# Patient Record
Sex: FEMALE | NOT HISPANIC OR LATINO | Employment: UNEMPLOYED | RURAL
[De-identification: names, ages, dates, MRNs, and addresses within clinical notes are randomized per-mention and may not be internally consistent; named-entity substitution may affect disease eponyms.]

---

## 2017-01-25 ENCOUNTER — OFFICE VISIT (OUTPATIENT)
Dept: FAMILY MEDICINE CLINIC | Facility: CLINIC | Age: 72
End: 2017-01-25

## 2017-01-25 VITALS
DIASTOLIC BLOOD PRESSURE: 74 MMHG | HEIGHT: 60 IN | BODY MASS INDEX: 24.15 KG/M2 | HEART RATE: 87 BPM | OXYGEN SATURATION: 98 % | WEIGHT: 123 LBS | TEMPERATURE: 98 F | SYSTOLIC BLOOD PRESSURE: 124 MMHG

## 2017-01-25 DIAGNOSIS — K21.9 GASTROESOPHAGEAL REFLUX DISEASE, ESOPHAGITIS PRESENCE NOT SPECIFIED: ICD-10-CM

## 2017-01-25 DIAGNOSIS — R73.03 PREDIABETES: ICD-10-CM

## 2017-01-25 DIAGNOSIS — E78.2 MIXED HYPERLIPIDEMIA: ICD-10-CM

## 2017-01-25 DIAGNOSIS — G89.29 CHRONIC PAIN OF BOTH KNEES: ICD-10-CM

## 2017-01-25 DIAGNOSIS — R10.13 EPIGASTRIC PAIN: ICD-10-CM

## 2017-01-25 DIAGNOSIS — T78.3XXA ANGIOEDEMA, INITIAL ENCOUNTER: Primary | ICD-10-CM

## 2017-01-25 DIAGNOSIS — M25.561 CHRONIC PAIN OF BOTH KNEES: ICD-10-CM

## 2017-01-25 DIAGNOSIS — M81.0 OSTEOPOROSIS: ICD-10-CM

## 2017-01-25 DIAGNOSIS — M25.562 CHRONIC PAIN OF BOTH KNEES: ICD-10-CM

## 2017-01-25 DIAGNOSIS — I10 ESSENTIAL HYPERTENSION: ICD-10-CM

## 2017-01-25 DIAGNOSIS — M54.42 LOW BACK PAIN WITH BILATERAL SCIATICA, UNSPECIFIED BACK PAIN LATERALITY, UNSPECIFIED CHRONICITY: ICD-10-CM

## 2017-01-25 DIAGNOSIS — M54.41 LOW BACK PAIN WITH BILATERAL SCIATICA, UNSPECIFIED BACK PAIN LATERALITY, UNSPECIFIED CHRONICITY: ICD-10-CM

## 2017-01-25 PROCEDURE — 99214 OFFICE O/P EST MOD 30 MIN: CPT | Performed by: FAMILY MEDICINE

## 2017-01-25 RX ORDER — HYDROCHLOROTHIAZIDE 25 MG/1
25 TABLET ORAL DAILY
Qty: 30 TABLET | Refills: 11 | Status: SHIPPED | OUTPATIENT
Start: 2017-01-25 | End: 2017-05-05 | Stop reason: SDUPTHER

## 2017-01-25 RX ORDER — FEXOFENADINE HYDROCHLORIDE 60 MG/1
60 TABLET, FILM COATED ORAL DAILY
Qty: 30 TABLET | Refills: 11 | Status: SHIPPED | OUTPATIENT
Start: 2017-01-25 | End: 2017-02-24

## 2017-01-25 NOTE — MR AVS SNAPSHOT
Brionna Salomón   1/25/2017 10:45 AM   Office Visit    Dept Phone:  [phone]   Encounter #:  [id number]    Provider:  Modesto Trujillo MD   Department:  [facility]                Your Full Care Plan              Today's Medication Changes          These changes are accurate as of: 1/25/17 10:54 AM.  If you have any questions, ask your nurse or doctor.               New Medication(s)Ordered:     fexofenadine 60 MG tablet   Commonly known as:  ALLEGRA ALLERGY   Take 1 tablet by mouth Daily.   Started by:  [facility] Trujillo MD         Medication(s)that have changed:     hydrochlorothiazide 25 MG tablet   Commonly known as:  HYDRODIURIL   Take 1 tablet by mouth Daily.   What changed:    - medication strength  - how much to take   Changed by:  [facility] Trujillo MD         Stop taking medication(s)listed here:     losartan 25 MG tablet   Commonly known as:  COZAAR   Stopped by:  Modesto Trujillo MD                Where to Get Your Medications      These medications were sent to [facility] [address] [place] [address] [phone] [place] [phone] [place] [address]     Phone:  [phone]     fexofenadine 60 MG tablet    hydrochlorothiazide 25 MG tablet                  Your Updated Medication List          This list is accurate as of: 1/25/17 10:54 AM.  Always use your most recent med list.                alendronate 70 MG tablet   Commonly known as:  FOSAMAX   Take 1 tablet by mouth every 7 days.       aspirin 81 MG chewable tablet   Chew 1 tablet daily.       fexofenadine 60 MG tablet   Commonly known as:  ALLEGRA ALLERGY   Take 1 tablet by mouth Daily.       fluticasone 50 MCG/ACT nasal spray   Commonly known as:  FLONASE   USE TWO SPRAY(S) IN EACH NOSTRIL ONCE DAILY FOR 30 DAYS       hydrochlorothiazide 25 MG tablet   Commonly known as:  HYDRODIURIL   Take 1 tablet by mouth Daily.       omeprazole 40  MG capsule   Commonly known as:  priLOSEC   Take 1 capsule by mouth Daily.       RA COL-RITE 100 MG capsule   Generic drug:  docusate sodium       TYLENOL 500 MG tablet   Generic drug:  acetaminophen       Vitamin D3 03761 UNITS capsule   Take 1 capsule by mouth every 7 days.       ZOCOR 40 MG tablet   Generic drug:  simvastatin               We Performed the Following     Allergens, Zone 5     CBC Auto Differential     Comprehensive Metabolic Panel     Hemoglobin A1c     Lipid Panel     Vitamin D 25 Hydroxy     XR Knee 1 or 2 View Bilateral     XR Spine Lumbar 4+ View       You Were Diagnosed With        Codes Comments    Angioedema, initial encounter    -  Primary ICD-10-CM: T78.3XXA  ICD-9-CM: 995.1     Essential hypertension     ICD-10-CM: I10  ICD-9-CM: 401.9     Mixed hyperlipidemia     ICD-10-CM: E78.2  ICD-9-CM: 272.2     Gastroesophageal reflux disease, esophagitis presence not specified     ICD-10-CM: K21.9  ICD-9-CM: 530.81     Osteoporosis     ICD-10-CM: M81.0  ICD-9-CM: 733.00     Epigastric pain     ICD-10-CM: R10.13  ICD-9-CM: 789.06     Prediabetes     ICD-10-CM: R73.03  ICD-9-CM: 790.29     Chronic pain of both knees     ICD-10-CM: M25.561, M25.562, G89.29  ICD-9-CM: 719.46, 338.29     Low back pain with bilateral sciatica, unspecified back pain laterality, unspecified chronicity     ICD-10-CM: M54.42, M54.41  ICD-9-CM: 724.3       Instructions    Stop losartan   - take HCTZ 12.5 mg x 2 pills = 25 mg and then  new medication    - continue checking Blood pressure at home   - will send back to [facility] for possible EGD  - continue omeprazole      Patient Instructions History      Upcoming Appointments     Visit Type Date Time Department    OFFICE VISIT 1/25/2017 10:45 AM [facility]    OFFICE VISIT 2/24/2017  8:45 AM [facility]      MyChart Signup     Our records indicate that you have declined [facility] signup. If you would like to sign up for [facility],  "please email [email or web address] or call [phone] to obtain an activation code.             Other Info from Your Visit           Your Appointments     Feb 24, 2017  8:45 AM CST   Office Visit with Modesto Trujillo MD   [facility] (--)    [address] [place], [address] [address] [phone]           Arrive 15 minutes prior to appointment.              Allergies     Lisinopril  Cough      Reason for Visit     Hypertension     Heartburn           Vital Signs     Blood Pressure Pulse Temperature Height Weight Oxygen Saturation    124/74 (BP Location: Right arm, Patient Position: Sitting, Cuff Size: Adult) 87 98 °F (36.7 °C) (Oral) 60\" (152.4 cm) 123 lb (55.8 kg) 98%    Body Mass Index Smoking Status                24.02 kg/m2 Never Smoker          Problems and Diagnoses Noted     Abdominal pain, pit of stomach    High blood pressure    Acid reflux disease    Mixed hyperlipidemia    Osteoporosis    Prediabetes    Angioedema    -  Primary    Chronic pain of both knees        Low back pain with bilateral sciatica, unspecified back pain laterality, unspecified chronicity            "

## 2017-01-25 NOTE — PROGRESS NOTES
Subjective   Brionna Lorenzana is a 71 y.o. female.       Problem List  1. Essential Hypertension  2. Hyperlipidemia  3. Constipation  4. Vitamin D deficiency  5. Osteoporosis.  6. Hypertriglyceridemia  7. Prediabetes  8. Microalbuminuria  9. Microdirect laryngoscopy with excision of right vocal cord lesion.  10. External and internal hemorrhoids    Pt is 72 yo  female who is here for recheck. On last visit had issues with GERD and epigastric tenderness.  Was started on Omeprazole 40 mg PO q daily.  States her epigastric pain is slightly better but still has tenderness. Has cut down on sodas.  Pain in epigastric area about 3/10 on severity.  No alarming symptoms. Had colonoscopy last year with Dr. Sandra but has not had EGD yet.  Pain does not radiate to back    Pt and daughter state that about New Year's Dawn pt developed swelling of lips.  Daughter thinks it may have been due to donut she ate.  Also occurred again this past Saturday.  Pt denies any shortness of breath but daughter thinks she may have.  Also sees Dr. Silva (ENT) for prior excision of right vocal cord. Was on lisinopril before for hypertension but that was discontinued. Pt was switched to losartan. Went to [facility] ER on New Year and did not see physician.  Was given Benadryl and that seemed to help symptoms.  Daughter gave another Benadryl which also helped.   Pt and daughter do not recall any history of food allergies.      Also has been having issues with lower back pain and bilateral knee pain that has been present for years. States lately it has been getting worse due to cold weather.  Does take Tylenol for pain which seems to help       Past Medical History   Diagnosis Date   • Constipation    • Dyslipidemia    • Dysphagia      unspecified   • Encounter for screening for malignant neoplasm of colon    • Essential hypertension    • Hypercholesteremia    • Vitamin D deficiency      unspecified     Past Surgical History   Procedure Laterality Date    • Laryngoscopy  03/23/2016     Microdirect laryngoscopy with excision of right vocal cord lesion.   • Tubal abdominal ligation  1976   • Colonoscopy  08/03/2016       Hypertension   This is a chronic problem. The problem is unchanged. The problem is controlled. Pertinent negatives include no anxiety, blurred vision, chest pain, headaches, malaise/fatigue, neck pain, orthopnea, palpitations, peripheral edema, PND, shortness of breath or sweats. There are no associated agents to hypertension. Risk factors for coronary artery disease include obesity, sedentary lifestyle and post-menopausal state. Past treatments include angiotensin blockers and diuretics. The current treatment provides significant improvement. There are no compliance problems.  There is no history of angina, kidney disease, CAD/MI, CVA, heart failure, left ventricular hypertrophy, PVD, renovascular disease, retinopathy or a thyroid problem. There is no history of chronic renal disease, coarctation of the aorta, hyperaldosteronism, hypercortisolism, hyperparathyroidism, a hypertension causing med, pheochromocytoma or sleep apnea.   Heartburn   She complains of abdominal pain and belching. She reports no chest pain, no choking, no coughing, no dysphagia, no early satiety, no globus sensation, no heartburn, no hoarse voice, no nausea, no sore throat, no stridor, no tooth decay, no water brash or no wheezing. This is a recurrent problem. The current episode started more than 1 month ago. The problem occurs occasionally. Pertinent negatives include no anemia, fatigue, melena, muscle weakness, orthopnea or weight loss. There are no known risk factors. She has tried a PPI for the symptoms. Past procedures do not include an abdominal ultrasound, an EGD, esophageal manometry, esophageal pH monitoring, H. pylori antibody titer or a UGI. Past invasive treatments do not include gastroplasty, gastroplication or reflux surgery.          The following portions of  "the patient's history were reviewed and updated as appropriate: allergies, current medications, past family history, past medical history, past social history, past surgical history and problem list.    Review of Systems   Constitutional: Negative.  Negative for fatigue, malaise/fatigue and weight loss.   HENT: Negative.  Negative for hoarse voice and sore throat.         Lip swelling    Eyes: Negative.  Negative for blurred vision.   Respiratory: Negative.  Negative for cough, choking, shortness of breath and wheezing.    Cardiovascular: Negative for chest pain, palpitations, orthopnea and PND.   Gastrointestinal: Positive for abdominal pain. Negative for dysphagia, heartburn, melena and nausea.   Endocrine: Negative.    Genitourinary: Negative.    Musculoskeletal: Negative.  Negative for muscle weakness and neck pain.   Skin: Negative.    Allergic/Immunologic: Negative.    Neurological: Negative.  Negative for headaches.   Hematological: Negative.    Psychiatric/Behavioral: Negative.        Objective    Visit Vitals   • /74 (BP Location: Right arm, Patient Position: Sitting, Cuff Size: Adult)   • Pulse 87   • Temp 98 °F (36.7 °C) (Oral)   • Ht 60\" (152.4 cm)   • Wt 123 lb (55.8 kg)   • SpO2 98%   • BMI 24.02 kg/m2       Chemistry        Component Value Date/Time     08/24/2016 0845    K 4.1 08/24/2016 0845    CL 99 08/24/2016 0845    CO2 31 08/24/2016 0845    BUN 18 08/24/2016 0845    CREATININE 0.9 08/24/2016 0845        Component Value Date/Time    CALCIUM 10.4 (H) 08/24/2016 0845    ALKPHOS 78 08/24/2016 0845    AST 30 08/24/2016 0845    ALT 33 08/24/2016 0845    BILITOT 0.5 08/24/2016 0845        Lab Results   Component Value Date    WBC 9.2 08/24/2016    HGB 13.5 08/24/2016    HCT 40.2 08/24/2016    MCV 90.1 08/24/2016     08/24/2016     Lab Results   Component Value Date    HGBA1C 5.9 (H) 08/24/2016     No results found for: CHOL  Lab Results   Component Value Date    TRIG 171 08/24/2016 "    TRIG 238 (H) 05/24/2016    TRIG 271 (H) 02/11/2016     Lab Results   Component Value Date    HDL 51 (L) 08/24/2016    HDL 54 (L) 05/24/2016    HDL 52 (L) 02/11/2016     Lab Results   Component Value Date    LDLCALC 110 08/24/2016    LDLCALC 104 05/24/2016    LDLCALC 203 (H) 02/11/2016     No results found for: LDL  No results found for: HDLLDLRATIO  No components found for: CHOLHDL     Lab Results   Component Value Date    TSH 1.41 02/11/2016     Physical Exam   Constitutional: She is oriented to person, place, and time. She appears well-developed and well-nourished. No distress.   HENT:   Head: Normocephalic and atraumatic.   Right Ear: External ear normal.   Left Ear: External ear normal.   Eyes: Conjunctivae and EOM are normal. Pupils are equal, round, and reactive to light. Right eye exhibits no discharge. Left eye exhibits no discharge. No scleral icterus.   Neck: Normal range of motion. Neck supple. No JVD present. No tracheal deviation present. No thyromegaly present.   Cardiovascular: Normal rate, regular rhythm and normal heart sounds.    Pulmonary/Chest: Effort normal and breath sounds normal. No stridor. No respiratory distress. She has no wheezes.   Abdominal: Soft. Bowel sounds are normal. She exhibits no distension and no mass. There is tenderness. There is no rebound and no guarding. No hernia.    Mild Epigastric tenderness on palpation'  No rebound tenderness no rigidity    Musculoskeletal: She exhibits tenderness. She exhibits no edema or deformity.        Right knee: She exhibits decreased range of motion and swelling. Tenderness found.        Left knee: She exhibits decreased range of motion and swelling. Tenderness found.        Lumbar back: She exhibits decreased range of motion, tenderness, bony tenderness, pain and spasm. She exhibits no swelling, no edema, no deformity, no laceration and normal pulse.   Lymphadenopathy:     She has no cervical adenopathy.   Neurological: She is alert and  oriented to person, place, and time. She has normal reflexes. She displays normal reflexes. No cranial nerve deficit. Coordination normal.   Skin: Skin is warm and dry. No rash noted. She is not diaphoretic. No erythema. No pallor.   Psychiatric: She has a normal mood and affect. Her behavior is normal. Judgment and thought content normal.   Nursing note and vitals reviewed.      Assessment/Plan   Problems Addressed this Visit        Cardiovascular and Mediastinum    Mixed hyperlipidemia    Relevant Orders    CBC Auto Differential    Comprehensive Metabolic Panel    Hemoglobin A1c    Lipid Panel    Vitamin D 25 Hydroxy    Essential hypertension    Relevant Medications    hydrochlorothiazide (HYDRODIURIL) 25 MG tablet       Digestive    Gastroesophageal reflux disease       Nervous and Auditory    Epigastric pain       Musculoskeletal and Integument    Osteoporosis       Other    Prediabetes    Angioedema - Primary    Relevant Orders    Allergens, Zone 5    Low back pain with bilateral sciatica    Relevant Orders    XR Spine Lumbar 4+ View    Chronic pain of both knees    Relevant Orders    XR Knee 1 or 2 View Bilateral        - For epigastric tenderness, GERD will refer back to [facility] / Dr. Sandra for possible EGD. Consultation appreciated. Continue with Omeprazole 40 mg PO q daily. Advised to limit caffeine and spicy foods  - will get basic labwork  Recheck cbc, cmp, hga1c for prediabetes and lipid panel for hyperlipidemia.    - continue with aspirin and simvastatin for hyperlipidemia .    - for hypertension - at goal today. Will stop losartan since potential side effect is angioedema.  Will increase HCTZ from 12.5 to 25 mg PO q daily. Pt to check BP at home and bring on next visit  - for osteoporosis - continue with fosamax once a week  -  Angioedema - will stop losartan. Will get allergy food panel.  Will start on allegra PO for allergies.  Consider CRP, ESR or urinalysis on next visit.  Pt to call me  or call 911 if symptoms persists or get worse   - for bilateral knee pain - will get x-rays of both knees - continue with Tylenol PRN  - for lower back pain - will get x-ray of lumbar spine - continue with Tylenol PRN  - recheck in 1 month

## 2017-01-25 NOTE — PATIENT INSTRUCTIONS
Stop losartan   - take HCTZ 12.5 mg x 2 pills = 25 mg and then  new medication    - continue checking Blood pressure at home   - will send back to [facility] for possible EGD  - continue omeprazole

## 2017-01-27 LAB
25(OH)D3 SERPL-MCNC: 52.8 NG/ML (ref 30–100)
ALBUMIN SERPL-MCNC: 4.6 G/DL (ref 3.4–4.8)
ALBUMIN/GLOB SERPL: 1.4 G/DL (ref 1.1–1.8)
ALP SERPL-CCNC: 64 U/L (ref 38–126)
ALT SERPL W P-5'-P-CCNC: 33 U/L (ref 9–52)
ANION GAP SERPL CALCULATED.3IONS-SCNC: 15 MMOL/L (ref 5–15)
ARTICHOKE IGE QN: 135 MG/DL (ref 1–129)
AST SERPL-CCNC: 27 U/L (ref 14–36)
BILIRUB SERPL-MCNC: 0.6 MG/DL (ref 0.2–1.3)
BUN BLD-MCNC: 25 MG/DL (ref 7–21)
BUN/CREAT SERPL: 28.7 (ref 7–25)
CALCIUM SPEC-SCNC: 10.1 MG/DL (ref 8.4–10.2)
CHLORIDE SERPL-SCNC: 99 MMOL/L (ref 95–110)
CHOLEST SERPL-MCNC: 210 MG/DL (ref 0–199)
CO2 SERPL-SCNC: 30 MMOL/L (ref 22–31)
CREAT BLD-MCNC: 0.87 MG/DL (ref 0.5–1)
GFR SERPL CREATININE-BSD FRML MDRD: 64 ML/MIN/1.73 (ref 39–90)
GFR SERPL CREATININE-BSD FRML MDRD: 78 ML/MIN/1.73 (ref 39–90)
GLOBULIN UR ELPH-MCNC: 3.2 GM/DL (ref 2.3–3.5)
GLUCOSE BLD-MCNC: 106 MG/DL (ref 60–100)
HBA1C MFR BLD: 6.24 % (ref 4–5.6)
HDLC SERPL-MCNC: 56 MG/DL (ref 60–200)
LDLC/HDLC SERPL: 2.22 {RATIO} (ref 0–3.22)
POTASSIUM BLD-SCNC: 3.6 MMOL/L (ref 3.5–5.1)
PROT SERPL-MCNC: 7.8 G/DL (ref 6.3–8.6)
SODIUM BLD-SCNC: 144 MMOL/L (ref 137–145)
TRIGL SERPL-MCNC: 148 MG/DL (ref 20–199)

## 2017-01-27 PROCEDURE — 86003 ALLG SPEC IGE CRUDE XTRC EA: CPT | Performed by: FAMILY MEDICINE

## 2017-01-27 PROCEDURE — 85025 COMPLETE CBC W/AUTO DIFF WBC: CPT | Performed by: FAMILY MEDICINE

## 2017-01-27 PROCEDURE — 80061 LIPID PANEL: CPT | Performed by: FAMILY MEDICINE

## 2017-01-27 PROCEDURE — 83036 HEMOGLOBIN GLYCOSYLATED A1C: CPT | Performed by: FAMILY MEDICINE

## 2017-01-27 PROCEDURE — 82306 VITAMIN D 25 HYDROXY: CPT | Performed by: FAMILY MEDICINE

## 2017-01-27 PROCEDURE — 80053 COMPREHEN METABOLIC PANEL: CPT | Performed by: FAMILY MEDICINE

## 2017-01-31 LAB
A ALTERNATA IGE QN: <0.1 KU/L
A FUMIGATUS IGE QN: <0.1 KU/L
AMER ROACH IGE QN: 0.27 KU/L
BAHIA GRASS IGE QN: <0.1 KU/L
BAYBERRY POLN IGE QN: <0.1 KU/L
BERMUDA GRASS IGE QN: <0.1 KU/L
BOXELDER IGE QN: <0.1 KU/L
C HERBARUM IGE QN: <0.1 KU/L
CAT DANDER IGG QN: <0.1 KU/L
COMMON RAGWEED IGE QN: <0.1 KU/L
CONV CLASS DESCRIPTION: ABNORMAL
D FARINAE IGE QN: 1.99 KU/L
D PTERONYSS IGE QN: 0.55 KU/L
DOG DANDER IGE QN: 0.23 KU/L
DOG FENNEL IGE QN: <0.1 KU/L
ENGL PLANTAIN IGE QN: <0.1 KU/L
GOOSEFOOT IGE QN: <0.1 KU/L
GUM-TREE IGE QN: <0.1 KU/L
ITALIAN CYPRESS IGE QN: <0.1 KU/L
JOHNSON GRASS IGE QN: <0.1 KU/L
M RACEMOSUS IGE QN: <0.1 KU/L
P NOTATUM IGE QN: <0.1 KU/L
PEPPER TREE IGE QN: <0.1 KU/L
PER RYE GRASS IGE QN: <0.1 KU/L
QUEEN PALM IGE QN: <0.1 KU/L
S BOTRYOSUM IGE QN: <0.1 KU/L
SHEEP SORREL IGE QN: <0.1 KU/L
T210-IGE PRIVET, COMMON: <0.1 KU/L
VIRG LIVE OAK IGE QN: <0.1 KU/L
WHITE ELM IGE QN: <0.1 KU/L

## 2017-02-01 ENCOUNTER — TELEPHONE (OUTPATIENT)
Dept: FAMILY MEDICINE CLINIC | Facility: CLINIC | Age: 72
End: 2017-02-01

## 2017-02-10 RX ORDER — SIMVASTATIN 40 MG
TABLET ORAL
Qty: 30 TABLET | Refills: 11 | Status: SHIPPED | OUTPATIENT
Start: 2017-02-10 | End: 2017-02-24

## 2017-02-24 ENCOUNTER — OFFICE VISIT (OUTPATIENT)
Dept: FAMILY MEDICINE CLINIC | Facility: CLINIC | Age: 72
End: 2017-02-24

## 2017-02-24 VITALS
DIASTOLIC BLOOD PRESSURE: 82 MMHG | BODY MASS INDEX: 23.84 KG/M2 | HEIGHT: 60 IN | WEIGHT: 121.4 LBS | TEMPERATURE: 98.2 F | HEART RATE: 80 BPM | OXYGEN SATURATION: 98 % | SYSTOLIC BLOOD PRESSURE: 138 MMHG

## 2017-02-24 DIAGNOSIS — R73.03 PREDIABETES: ICD-10-CM

## 2017-02-24 DIAGNOSIS — M51.36 DEGENERATIVE DISC DISEASE, LUMBAR: Primary | ICD-10-CM

## 2017-02-24 DIAGNOSIS — R12 HEARTBURN: ICD-10-CM

## 2017-02-24 DIAGNOSIS — E78.5 HYPERLIPIDEMIA, UNSPECIFIED HYPERLIPIDEMIA TYPE: ICD-10-CM

## 2017-02-24 DIAGNOSIS — K21.9 GASTROESOPHAGEAL REFLUX DISEASE, ESOPHAGITIS PRESENCE NOT SPECIFIED: ICD-10-CM

## 2017-02-24 PROCEDURE — 99213 OFFICE O/P EST LOW 20 MIN: CPT | Performed by: FAMILY MEDICINE

## 2017-02-24 RX ORDER — FEXOFENADINE HYDROCHLORIDE 60 MG/1
60 TABLET, FILM COATED ORAL 2 TIMES DAILY
COMMUNITY
End: 2017-05-05 | Stop reason: SDUPTHER

## 2017-02-24 RX ORDER — LOSARTAN POTASSIUM 25 MG/1
25 TABLET ORAL 2 TIMES DAILY
COMMUNITY
Start: 2017-02-10 | End: 2017-02-24 | Stop reason: SINTOL

## 2017-02-24 RX ORDER — SIMVASTATIN 80 MG
80 TABLET ORAL NIGHTLY
Qty: 30 TABLET | Refills: 11 | Status: SHIPPED | OUTPATIENT
Start: 2017-02-24 | End: 2017-05-05 | Stop reason: SDUPTHER

## 2017-02-24 NOTE — PROGRESS NOTES
Subjective   Brionna Lorenzana is a 71 y.o. female.     Problem List  1. Essential Hypertension  2. Hyperlipidemia  3. Constipation  4. Vitamin D deficiency  5. Osteoporosis.  6. Hypertriglyceridemia  7. Prediabetes  8. Microalbuminuria  9. Microdirect laryngoscopy with excision of right vocal cord lesion.  10. External and internal hemorrhoids    Pt is 72 yo  female with the above medical issues. States her epigastric pain, heartburn and reflux is better after starting omeprazole 40 mg PO q daily but pain in area still occurs every once in a while.  States she belches a lot too.  No dysphagia.  Pt had colonoscopy but has yet to have EGD. Is agreeable to have EGD done.  Last colonoscopy in August 2016 showed external and internal hemorrhoids.      Regarding lower back pain had x-ray of lumbar spine that showed degenerative disc disease.  She would like to do physical therapy. Pain in lower back mainly around left lower paraspinal muscles. Grades pain today 4/10 on severity.  Does not want to try medication for now    Regarding last labwork. hga1c got worse from 5.9 to 6.1  Has tried cutting down carbs and rice in diet  Regarding lipid panel. Cholesterol levels elevated.  Currently on simvastatin 40 mg PO q daily    Heartburn   She complains of abdominal pain and belching. She reports no choking, no coughing, no dysphagia, no early satiety, no globus sensation, no heartburn, no hoarse voice, no nausea, no sore throat, no stridor, no tooth decay, no water brash or no wheezing. This is a chronic problem. The current episode started more than 1 year ago. The problem occurs constantly. The problem has been gradually improving. Nothing aggravates the symptoms. Pertinent negatives include no anemia or fatigue. She has tried a PPI for the symptoms. The treatment provided moderate relief. Past procedures do not include an abdominal ultrasound, an EGD, esophageal manometry, esophageal pH monitoring, H. pylori antibody titer  "or a UGI. Past invasive treatments do not include gastroplasty, gastroplication or reflux surgery.     The following portions of the patient's history were reviewed and updated as appropriate: allergies, current medications, past family history, past medical history, past social history, past surgical history and problem list.    Review of Systems   Constitutional: Negative for fatigue.   HENT: Negative for hoarse voice and sore throat.    Respiratory: Negative for cough, choking and wheezing.    Gastrointestinal: Positive for abdominal pain. Negative for dysphagia, heartburn and nausea.       Objective    Visit Vitals   • /82 (BP Location: Left arm, Patient Position: Sitting, Cuff Size: Adult)   • Pulse 80   • Temp 98.2 °F (36.8 °C) (Oral)   • Ht 60\" (152.4 cm)   • Wt 121 lb 6.4 oz (55.1 kg)   • SpO2 98%   • BMI 23.71 kg/m2       Chemistry        Component Value Date/Time     01/27/2017 0857     08/24/2016 0845    K 3.6 01/27/2017 0857    K 4.1 08/24/2016 0845    CL 99 01/27/2017 0857    CL 99 08/24/2016 0845    CO2 30.0 01/27/2017 0857    CO2 31 08/24/2016 0845    BUN 25 (H) 01/27/2017 0857    BUN 18 08/24/2016 0845    CREATININE 0.87 01/27/2017 0857    CREATININE 0.9 08/24/2016 0845        Component Value Date/Time    CALCIUM 10.1 01/27/2017 0857    CALCIUM 10.4 (H) 08/24/2016 0845    ALKPHOS 64 01/27/2017 0857    ALKPHOS 78 08/24/2016 0845    AST 27 01/27/2017 0857    AST 30 08/24/2016 0845    ALT 33 01/27/2017 0857    ALT 33 08/24/2016 0845    BILITOT 0.6 01/27/2017 0857    BILITOT 0.5 08/24/2016 0845        Lab Results   Component Value Date    WBC 6.99 01/27/2017    HGB 13.3 01/27/2017    HCT 40.0 01/27/2017    MCV 88.9 01/27/2017     01/27/2017     Lab Results   Component Value Date    CHOL 210 (H) 01/27/2017     Lab Results   Component Value Date    TRIG 148 01/27/2017    TRIG 171 08/24/2016    TRIG 238 (H) 05/24/2016     Lab Results   Component Value Date    HDL 56 (L) 01/27/2017 "    HDL 51 (L) 08/24/2016    HDL 54 (L) 05/24/2016     Lab Results   Component Value Date    LDLCALC 110 08/24/2016    LDLCALC 104 05/24/2016    LDLCALC 203 (H) 02/11/2016     No results found for: LDL  No results found for: HDLLDLRATIO  No components found for: CHOLHDL  Lab Results   Component Value Date    HGBA1C 6.24 (H) 01/27/2017     Lab Results   Component Value Date    TSH 1.41 02/11/2016     Physical Exam   Constitutional: She is oriented to person, place, and time. She appears well-developed and well-nourished. No distress.   HENT:   Head: Atraumatic.   Right Ear: External ear normal.   Left Ear: External ear normal.   Eyes: Conjunctivae and EOM are normal. Pupils are equal, round, and reactive to light. Right eye exhibits no discharge. Left eye exhibits no discharge. No scleral icterus.   Neck: Normal range of motion. Neck supple. No JVD present. No tracheal deviation present. No thyromegaly present.   Cardiovascular: Normal rate, regular rhythm and normal heart sounds.  Exam reveals no friction rub.    No murmur heard.  Pulmonary/Chest: Effort normal and breath sounds normal. No stridor. No respiratory distress. She has no wheezes.   Abdominal: Soft. Bowel sounds are normal. She exhibits no distension and no mass. There is no tenderness. There is no rebound and no guarding. No hernia.   Musculoskeletal: She exhibits tenderness. She exhibits no edema or deformity.        Lumbar back: She exhibits decreased range of motion, tenderness, bony tenderness, pain and spasm.   Lymphadenopathy:     She has no cervical adenopathy.   Neurological: She is alert and oriented to person, place, and time. She has normal reflexes. No cranial nerve deficit. Coordination normal.   Skin: Skin is warm and dry. No rash noted. She is not diaphoretic. No erythema. No pallor.   Psychiatric: She has a normal mood and affect. Her behavior is normal. Thought content normal.       Assessment/Plan   Problems Addressed this Visit         Cardiovascular and Mediastinum    Hyperlipidemia    Relevant Medications    simvastatin (ZOCOR) 80 MG tablet       Digestive    Gastroesophageal reflux disease    Relevant Orders    Ambulatory Referral to Gastroenterology    Heartburn       Musculoskeletal and Integument    Degenerative disc disease, lumbar - Primary    Relevant Orders    Ambulatory Referral to Physical Therapy        - For GERD/heartburn will refer to [facility] and Dr. Sandra for possible EGD. Consultation appreciated  Pt to continue omeprazole 40 mg PO q daily  - for Degenerative Disc Disease lumbar spine - will refer to PT/OT before starting medication  - for hyperlipidemia- will go up on simvastatin from 40 to 80 mg PO q daily  - recheck in 3 months or earlier if any problems arise  - prediabetes - advised pt to limit carb and sugar intake. Continue to monitor hga1c

## 2017-03-10 ENCOUNTER — HOSPITAL ENCOUNTER (OUTPATIENT)
Dept: PHYSICAL THERAPY | Facility: HOSPITAL | Age: 72
Setting detail: THERAPIES SERIES
Discharge: HOME | End: 2017-03-10

## 2017-03-10 DIAGNOSIS — M51.36 DEGENERATIVE DISC DISEASE, LUMBAR: Primary | ICD-10-CM

## 2017-03-10 PROCEDURE — 97162 PT EVAL MOD COMPLEX 30 MIN: CPT | Performed by: PHYSICAL THERAPIST

## 2017-03-10 NOTE — PROGRESS NOTES
Outpatient Physical Therapy Ortho Initial Evaluation  [facility]  Alina Stanford, PT, DPT, CSCS       Patient Name: Brionna Lorenzana  : 1945  MRN: [id number]  Today's Date: 3/10/2017      Visit Date: 03/10/2017     Pt reports 5/10 pain.  Reports N/A% of improvement.  Attended  visits.  Insurance available: 20 visits per calendar year.  Next MD appt: 2017.  Recertification: 2017.    Patient Active Problem List   Diagnosis   • General medical examination   • Encounter for screening for osteoporosis   • Mixed hyperlipidemia   • Encounter for immunization   • Hypernatremia   • Microalbuminuria   • ACE-inhibitor cough   • Constipation   • Essential hypertension   • Prediabetes   • Osteoporosis   • Epigastric pain   • Gastroesophageal reflux disease   • Angioedema   • Low back pain with bilateral sciatica   • Chronic pain of both knees   • Heartburn   • Degenerative disc disease, lumbar   • Hyperlipidemia        Past Medical History   Diagnosis Date   • Constipation    • Dyslipidemia    • Dysphagia      unspecified   • Encounter for screening for malignant neoplasm of colon    • Essential hypertension    • Hypercholesteremia    • Vitamin D deficiency      unspecified        Past Surgical History   Procedure Laterality Date   • Laryngoscopy  2016     Microdirect laryngoscopy with excision of right vocal cord lesion.   • Tubal abdominal ligation     • Colonoscopy  2016       Visit Dx:     ICD-10-CM ICD-9-CM   1. Degenerative disc disease, lumbar M51.36 722.52     Number of days off work: N/A    Patient is .    Patient has grown children.    Medications: See EMR    Allergies: See EMR          Patient History       03/10/17 0900          History    Chief Complaint Pain  -AJ      Type of Pain Back pain  -AJ      Date Current Problem(s) Began --   Chronic, worsening over the last 3 months  -AJ      Brief Description of Current Complaint Patient reports when  the weather gets cold it really bothers her. No real previous issues Reports pain down into mainly the hips, but denies true numbness and tingling,  mainly aching in the hips.  -AJ      Previous treatment for THIS PROBLEM --   None.  -AJ      Patient/Caregiver Goals Relieve pain  -AJ      Current Tobacco Use None.  -AJ      Smoking Status Non-smoker  -AJ      Patient's Rating of General Health Good  -AJ      Occupation/sports/leisure activities Unemployed  -AJ      Patient seeing anyone else for problem(s)? No  -AJ      What clinical tests have you had for this problem? X-ray  -AJ      Results of Clinical Tests DDD of lumbar spine  -AJ      Related/Recent Hospitalizations No  -AJ      History of Previous Related Injuries None.  -AJ      Pain     Pain Location Back  -AJ      Pain at Present 5  -AJ      Pain at Best 0  -AJ      Pain at Worst 9  -AJ      Pain Frequency Intermittent  -AJ      Pain Description Dull;Aching;Tightness  -AJ      What Performance Factors Make the Current Problem(s) WORSE? Sleeping, sweeping, walking  -AJ      What Performance Factors Make the Current Problem(s) BETTER? Laying down, stretching  -AJ      Difficulties at work? N/A  -AJ      Difficulties with ADL's? Dressing  -AJ      Difficulties with recreational activities? sweeping, doing dishes  -AJ      Daily Activities    Primary Language --   Taglogtrust  -AJ      Action taken if English not primary language Used the   -AJ      Are you able to read Yes   Tagalog  -AJ      Are you able to write Yes   Tagalog  -AJ      How does patient learn best? Demonstration  -AJ      Teaching needs identified Management of Condition;Home Exercise Program  -AJ      Patient is concerned about/has problems with Standing  -AJ      Barriers to learning Language  -AJ      Action taken for identified issues Use of the interprature.  -AJ      Pt Participated in POC and Goals Yes   With use of interprature.  -AJ        User Key  (r) = Recorded By, (t) =  Taken By, (c) = Cosigned By    Initials Name Provider Type    AJ Alina Stanford, PT Physical Therapist                PT Ortho       03/10/17 0900    Subjective Comments    Subjective Comments Pain mainly at night and doing dishes.  -AJ    Precautions and Contraindications    Precautions Does not speak english  -AJ    Subjective Pain    Able to rate subjective pain? yes  -AJ    Pre-Treatment Pain Level 5  -AJ    Posture/Observations    Alignment Options Forward head;Cervical lordosis;Thoracic kyphosis;Rounded shoulders;Lumbar lordosis;Scoliosis  -AJ    Forward Head Mild;Increased  -AJ    Cervical Lordosis Mild;Increased  -AJ    Thoracic Kyphosis Mild;Increased  -AJ    Rounded Shoulders Bilateral:;Mild;Increased  -AJ    Scoliosis Normal   none noted  -AJ    Lumbar lordosis Normal  -AJ    Observations --   No acute distress  -AJ    Posture/Observations Comments Poor sitting postural awareness  -AJ    Sensation    Sensation WNL? WNL  -AJ    Light Touch No apparent deficits  -AJ    Sharp/Dull No apparent deficits  -AJ    Additional Comments Denies and specific numbness and tingling, does report pain in hips and down the legs, but denies it at numbness/tingling, more aching  -AJ    Lumbar/SI Special Tests    Standing Flexion Test (SI Dysfunction) Negative  -AJ    Stork Test (SI Dysfunction) Bilateral:;Negative  -AJ    Trendelenburg Test (Gluteus Medius Weakness) Bilateral:;Negative  -AJ    Slump Test (Neural Tension) Bilateral:;Negative  -AJ    Payton Álvaro Test (HNP) Negative  -AJ    SLR (Neural Tension) Bilateral:;Negative  -AJ    SI Compression Test (SI Dysfunction) Bilateral:;Negative  -AJ    SI Distraction Test (SI Dysfunction) Bilateral:;Negative  -AJ    MARQUIS (hip vs. SI Dysfunction) Bilateral:;Negative  -AJ    FAIR Test (Piriformis Syndrome) Bilateral:;Negative  -AJ    Sacral Spring Test (SI Dysfunction) Negative  -AJ    Trunk    Flexion AROM --   108°  -AJ    Extension AROM --   28°  -AJ    Left Lateral  Flexion AROM WNL (0-35 degrees)  -AJ    Right Lateral Flexion AROM WNL (0-35 degrees)  -AJ    Left Rotation AROM WNL (0-45 degrees)  -AJ    Right Rotation AROM WNL (0-45 degrees)  -AJ    MMT (Manual Muscle Testing)    General MMT Assessment Detail B LE 5/5 except the ones listed specifically.  -AJ    Left Hip    Hip Flexion Gross Movement (4+/5) good plus  -AJ    Right Hip    Hip Flexion Gross Movement (4+/5) good plus  -AJ    Left Knee    Knee Flexion Hamstrings (4+/5) good plus  -AJ    Right Knee    Knee Flexion Hamstrings (4+/5) good plus  -AJ    Flexibility    Flexibility Tested? Lower Extremity   all WNL  -AJ    Transfers    Transfer, Comment I with all transfer.  -    Gait Assessment/Treatment    Gait, Comment FWB, non-antalgic gait, no acute distress  -      User Key  (r) = Recorded By, (t) = Taken By, (c) = Cosigned By    Initials Name Provider Type    TATIANA Stanford PT Physical Therapist                  Therapy Education       03/10/17 0900          Therapy Education    Given --   Discussion of POC  -      Program New  -TATIANA      How Provided Verbal  -AJ      Provided to Patient;Caregiver  -TATIANA      Level of Understanding Verbalized  -        User Key  (r) = Recorded By, (t) = Taken By, (c) = Cosigned By    Initials Name Provider Type    TATIANA Stanford PT Physical Therapist                PT OP Goals       03/10/17 0900       PT Short Term Goals    STG Date to Achieve 03/31/17  -     STG 1 I with HEp and have additions/changes by next recertification.  -     STG 2 AROM lumbar extension 35° or greater.  -     STG 3 B hip flexion 5/5  -     STG 4 B HS 5/5.  -     STG 5 Negative prabha B.  -     Long Term Goals    LTG Date to Achieve 04/07/17  -     LTG 1 Able to show proper lifting technique floor to waist to shoulder level.  -     LTG 2 Able to show proper ergonomics for sweeping and dishes simulation.  -     LTG 3 I with final HEP.  -     LTG 4 D/C with free 30  day fitness formula membership.  -AJ     Time Calculation    PT Goal Re-Cert Due Date 03/31/17  -AJ       User Key  (r) = Recorded By, (t) = Taken By, (c) = Cosigned By    Initials Name Provider Type    TATIANA Stanford, PT Physical Therapist        Barriers to Rehab: Include significant or possible significant arthritic or degenerative changes that have occurred within the spine, The patient's generally deconditioned state, non-english speaking.  Possible poor/questionable compliance with HEP  Possible poor overall attendance/compliance  Possible monetary/secondary gain    Safety Issues: Non-english speaking          PT Assessment/Plan       03/10/17 0900       PT Assessment    Functional Limitations Limitation in home management;Limitations in community activities;Performance in self-care ADL  -AJ     Impairments Endurance;Impaired muscle length;Impaired muscle endurance;Muscle strength;Pain;Poor body mechanics;Posture;Range of motion  -AJ     Assessment Comments No formal ther ex was performed today secondary to evaluation taking longer with use of interprature and running out of time.  -AJ     Rehab Potential Fair  -AJ     Patient/caregiver participated in establishment of treatment plan and goals Yes  -AJ     Patient would benefit from skilled therapy intervention Yes  -AJ     PT Plan    PT Frequency 2x/week  -AJ     Predicted Duration of Therapy Intervention (days/wks) 3-4 weeks , 6-8 visits  -AJ     Planned CPT's? PT EVAL MOD COMPLELITY: 48827;PT RE-EVAL: 22389;PT THER PROC EA 15 MIN: 64418;PT THER ACT EA 15 MIN: 09085;PT MANUAL THERAPY EA 15 MIN: 58773;PT ELECTRICAL STIM UNATTEND: ;PT THER SUPP EA 15 MIN  -AJ     Physical Therapy Interventions (Optional Details) balance training;gross motor skills;home exercise program;lumbar stabilization;manual therapy techniques;modalities;ROM (Range of Motion);strengthening;stretching;transfer training  -     PT Plan Comments Focus on core stab and  ergonomics  -       User Key  (r) = Recorded By, (t) = Taken By, (c) = Cosigned By    Initials Name Provider Type    TATIANA Stanford PT Physical Therapist        Other therapeutic activities and/or exercises will be prescribed depending on the patients progress or lack there of.          Exercises       03/10/17 0900          Subjective Comments    Subjective Comments Pain mainly at night and doing dishes.  -      Subjective Pain    Able to rate subjective pain? yes  -      Pre-Treatment Pain Level 5  -        User Key  (r) = Recorded By, (t) = Taken By, (c) = Cosigned By    Initials Name Provider Type    TATIANA Stanford PT Physical Therapist                              Outcome Measures       03/10/17 0900          Modified Oswestry    Modified Oswestry Score/Comments 18%  -      Functional Assessment    Outcome Measure Options Modifed Owestry  -        User Key  (r) = Recorded By, (t) = Taken By, (c) = Cosigned By    Initials Name Provider Type    TATIANA Stanford PT Physical Therapist            Time Calculation:   Start Time: 0905  Stop Time: 0942  Time Calculation (min): 37 min     Therapy Charges for Today     Code Description Service Date Service Provider Modifiers Qty    00447204269 HC PT EVAL MOD COMPLEXITY 1 3/10/2017 Alina Stanford, PT GP 1    89524716712 HC PT THER SUPP EA 15 MIN 3/10/2017 Alina Stanford, PT GP 1          PT G-Codes  Outcome Measure Options: Modifed Owestry         Alina Stanford, PT, DPT, CSCS  3/10/2017

## 2017-03-17 ENCOUNTER — HOSPITAL ENCOUNTER (OUTPATIENT)
Dept: PHYSICAL THERAPY | Facility: HOSPITAL | Age: 72
Setting detail: THERAPIES SERIES
Discharge: HOME | End: 2017-03-17

## 2017-03-17 DIAGNOSIS — M51.36 DEGENERATIVE DISC DISEASE, LUMBAR: Primary | ICD-10-CM

## 2017-03-17 PROCEDURE — 97110 THERAPEUTIC EXERCISES: CPT

## 2017-03-17 NOTE — PROGRESS NOTES
"    Outpatient Physical Therapy Ortho Treatment Note  [facility]  Debbie Copeland, RUBI       Patient Name: Brionna Lorenzana  : 1945  MRN: [id number]  Today's Date: 3/17/2017      Visit Date: 2017     Visits: 2/2  Insurance Visits Approved: 20 visits  Recert Due: 2017  MD Appt: TBD  Pain: pretreatment \"a little in my hips\"/10; post treatment 3/10  Improvement: pt is subjectively reporting 0% improvement since initial evaluation      Use Video Interpretor on Tagolog Language for Treatment    Visit Dx:    ICD-10-CM ICD-9-CM   1. Degenerative disc disease, lumbar M51.36 722.52       Patient Active Problem List   Diagnosis   • General medical examination   • Encounter for screening for osteoporosis   • Mixed hyperlipidemia   • Encounter for immunization   • Hypernatremia   • Microalbuminuria   • ACE-inhibitor cough   • Constipation   • Essential hypertension   • Prediabetes   • Osteoporosis   • Epigastric pain   • Gastroesophageal reflux disease   • Angioedema   • Low back pain with bilateral sciatica   • Chronic pain of both knees   • Heartburn   • Degenerative disc disease, lumbar   • Hyperlipidemia        Past Medical History   Diagnosis Date   • Constipation    • Dyslipidemia    • Dysphagia      unspecified   • Encounter for screening for malignant neoplasm of colon    • Essential hypertension    • Hypercholesteremia    • Vitamin D deficiency      unspecified        Past Surgical History   Procedure Laterality Date   • Laryngoscopy  2016     Microdirect laryngoscopy with excision of right vocal cord lesion.   • Tubal abdominal ligation     • Colonoscopy  2016             PT Ortho       17 0930    Subjective Comments    Subjective Comments states that she had some pain last night and has a little today in her hips. patient states that standing and walking makes the pain worse, especially with shopping with her daughter as it typically takes a long time. states " that laying down and resting makes the pain feel better  -    Precautions and Contraindications    Precautions Does not speak english, use video interpretor on Tagalog Language  -    Subjective Pain    Able to rate subjective pain? yes  -    Pre-Treatment Pain Level --   states that right now its mild in her hips but does not rate  -    Post-Treatment Pain Level 3  -    Posture/Observations    Posture/Observations Comments poor seated posture which corrects with verbal and tactile cues. Good response to cues   -      User Key  (r) = Recorded By, (t) = Taken By, (c) = Cosigned By    Initials Name Provider Type    GAYATRI Copeland PTA Physical Therapy Assistant                            PT Assessment/Plan       03/17/17 0930       PT Assessment    Assessment Comments with use of video interpretor, patient is issued written/pictoral handouts of the exercises completed today for HEP. patient verbalizes understanding of exercises and daugther who accompanies her verbalizes understanding. patient is able to complete all therex with cues and has improved posture awareness with cues  -     PT Plan    PT Frequency 2x/week  -     Predicted Duration of Therapy Intervention (days/wks) 3-4 weeks; 6-8 visits  -     PT Plan Comments next visit DKTC ball rolls, continue to provide postural cues.   -       User Key  (r) = Recorded By, (t) = Taken By, (c) = Cosigned By    Initials Name Provider Type    GAYATRI Copeland PTA Physical Therapy Assistant                    Exercises       03/17/17 0930          Subjective Comments    Subjective Comments states that she had some pain last night and has a little today in her hips. patient states that standing and walking makes the pain worse, especially with shopping with her daughter as it typically takes a long time. states that laying down and resting makes the pain feel better  -      Subjective Pain    Able to rate subjective pain? yes  -      Pre-Treatment  Pain Level --   states that right now its mild in her hips but does not rate  -      Post-Treatment Pain Level 3  -      Exercise 1    Exercise Name 1 Pro II LE's Seat 5  -      Resistance 1 --   L 4.0  -MH      Time (Minutes) 1 10 minutes  -      Exercise 2    Exercise Name 2 St. HS Stretch Bilateral  -MH      Reps 2 2  -MH      Time (Seconds) 2 30 seconds  -MH      Exercise 3    Exercise Name 3 Seated Bilateral Piriformis Stretch  -MH      Reps 3 2  -MH      Time (Seconds) 3 30 seconds  -MH      Exercise 4    Exercise Name 4 LTR  -MH      Reps 4 10  -MH      Time (Seconds) 4 10 second hold  -MH      Exercise 5    Exercise Name 5 Bridge  -MH      Reps 5 10  -MH      Time (Seconds) 5 5 second hold  -        User Key  (r) = Recorded By, (t) = Taken By, (c) = Cosigned By    Initials Name Provider Type     Debbie Copeland, PTA Physical Therapy Assistant                               PT OP Goals       03/17/17 0930       PT Short Term Goals    STG Date to Achieve 03/31/17  -     STG 1 I with HEp and have additions/changes by next recertification.  -     STG 1 Progress Progressing  -     STG 2 AROM lumbar extension 35° or greater.  -     STG 2 Progress Progressing  -     STG 3 B hip flexion 5/5  -     STG 3 Progress Progressing  -     STG 4 B HS 5/5.  -     STG 4 Progress Progressing  -     STG 5 Negative prabha B.  -     STG 5 Progress Progressing  -     Long Term Goals    LTG Date to Achieve 04/07/17  -     LTG 1 Able to show proper lifting technique floor to waist to shoulder level.  -     LTG 1 Progress Progressing  -     LTG 2 Able to show proper ergonomics for sweeping and dishes simulation.  -     LTG 2 Progress Progressing  -     LTG 3 I with final HEP.  -     LTG 3 Progress Progressing  -     LTG 4 D/C with free 30 day fitness formula membership.  -     LTG 4 Progress Not Met  -     Time Calculation    PT Goal Re-Cert Due Date 03/31/17  -       User Key  (r) =  Recorded By, (t) = Taken By, (c) = Cosigned By    Initials Name Provider Type     Debbie Copeland PTA Physical Therapy Assistant                Therapy Education       03/17/17 0930          Therapy Education    Given HEP;Posture/body mechanics  -MH      Program New   St. HS Stretch, piriformis stretch, LTR, bridges to HEP  -MH      How Provided Verbal;Demonstration;Written;Other (comment)   instructions also given to daughter with patients permission  -MH      Provided to Patient;Caregiver   pt's daugther is english speaking, discussed HEP   -MH      Level of Understanding Verbalized;Demonstrated;Other (comment)   both pt and daugther verbalize understanding  -MH        User Key  (r) = Recorded By, (t) = Taken By, (c) = Cosigned By    Initials Name Provider Type     Debbie Copeland PTA Physical Therapy Assistant                Time Calculation:   Start Time: 0931  Stop Time: 1015  Time Calculation (min): 44 min  Total Timed Code Minutes- PT: 44 minute(s)    Therapy Charges for Today     Code Description Service Date Service Provider Modifiers Qty    07691510308 HC PT THER SUPP EA 15 MIN 3/17/2017 Debbie Copeland PTA GP 1    31418115625 HC PT THER PROC EA 15 MIN 3/17/2017 Debbie Copeland PTA GP 3                    Debbie Copeland PTA  3/17/2017

## 2017-03-20 ENCOUNTER — HOSPITAL ENCOUNTER (OUTPATIENT)
Dept: PHYSICAL THERAPY | Facility: HOSPITAL | Age: 72
Setting detail: THERAPIES SERIES
Discharge: HOME | End: 2017-03-20

## 2017-03-20 DIAGNOSIS — M51.36 DEGENERATIVE DISC DISEASE, LUMBAR: Primary | ICD-10-CM

## 2017-03-20 PROCEDURE — 97110 THERAPEUTIC EXERCISES: CPT

## 2017-03-20 NOTE — PROGRESS NOTES
"    Outpatient Physical Therapy Ortho Treatment Note  [facility]  Dominique Zhao, PTA  17  11:58 AM       Patient Name: Brionna Lorenzana  : 1945  MRN: [id number]  Today's Date: 3/20/2017      Visit Date: 2017  Subjective Improvement: \"better\"       Attendance: 3/3   Approved: 20 visits           MD follow up: May           RC date: 3/31/17        Visit Dx:    ICD-10-CM ICD-9-CM   1. Degenerative disc disease, lumbar M51.36 722.52       Patient Active Problem List   Diagnosis   • General medical examination   • Encounter for screening for osteoporosis   • Mixed hyperlipidemia   • Encounter for immunization   • Hypernatremia   • Microalbuminuria   • ACE-inhibitor cough   • Constipation   • Essential hypertension   • Prediabetes   • Osteoporosis   • Epigastric pain   • Gastroesophageal reflux disease   • Angioedema   • Low back pain with bilateral sciatica   • Chronic pain of both knees   • Heartburn   • Degenerative disc disease, lumbar   • Hyperlipidemia        Past Medical History   Diagnosis Date   • Constipation    • Dyslipidemia    • Dysphagia      unspecified   • Encounter for screening for malignant neoplasm of colon    • Essential hypertension    • Hypercholesteremia    • Vitamin D deficiency      unspecified        Past Surgical History   Procedure Laterality Date   • Laryngoscopy  2016     Microdirect laryngoscopy with excision of right vocal cord lesion.   • Tubal abdominal ligation     • Colonoscopy  2016             PT Ortho       17 1100    Subjective Comments    Subjective Comments pt states that the pain is not bad today  -DAYAN    Precautions and Contraindications    Precautions Does not speak english, use video interpretor on [employer]  -DAYAN    Subjective Pain    Able to rate subjective pain? yes  -DAYAN    Pre-Treatment Pain Level 3  -DAYAN    Post-Treatment Pain Level 0  -DAYAN      User Key  (r) = Recorded By, (t) = Taken By, (c) = Cosigned " By    Initials Name Provider Type    DAYAN Zhao PTA Physical Therapy Assistant                            PT Assessment/Plan       03/20/17 1100       PT Assessment    Assessment Comments pt able to complete therex using a video . pt understands increase in exercise.  -DAYAN     PT Plan    PT Frequency 2x/week  -DAYAN     Predicted Duration of Therapy Intervention (days/wks) 3-4 weeks 6-8 visits  -DAYAN     PT Plan Comments DKTC with physioball next  -DAYAN       User Key  (r) = Recorded By, (t) = Taken By, (c) = Cosigned By    Initials Name Provider Type    DAYAN Zhao PTA Physical Therapy Assistant                Modalities       03/20/17 1100          Moist Heat    MH Applied Yes  -DAYAN      Location Lower back  -DAYAN      Rx Minutes 15 mins  -DAYAN      MH S/P Rx Yes  -DAYAN        User Key  (r) = Recorded By, (t) = Taken By, (c) = Cosigned By    Initials Name Provider Type    DAYAN Zhao PTA Physical Therapy Assistant                Exercises       03/20/17 1100          Subjective Comments    Subjective Comments pt states that the pain is not bad today  -DAYAN      Subjective Pain    Able to rate subjective pain? yes  -DAYAN      Pre-Treatment Pain Level 3  -DAYAN      Post-Treatment Pain Level 0  -DAYAN      Aquatics    Aquatics performed? No  -DAYAN      Exercise 1    Exercise Name 1 Pro II LE's Seat 5  -DAYAN      Resistance 1 --   L4.0  -DAYAN      Time (Minutes) 1 10 min  -DAYAN      Exercise 2    Exercise Name 2 HS stretch  -DAYAN      Reps 2 2  -DAYAN      Time (Seconds) 2 30 sec hold  -DAYAN      Exercise 3    Exercise Name 3 Seated piriformis stretch  -DAYAN      Reps 3 2  -DAYAN      Time (Seconds) 3 30 sec hold  -DAYAN      Exercise 4    Exercise Name 4 LTR  -DAYAN      Reps 4 10  -DAYAN      Time (Seconds) 4 10 sec hold  -DAYAN      Exercise 5    Exercise Name 5 Hip add squeezes  -DAYAN      Sets 5 2  -DAYAN      Reps 5 10  -DAYAN      Exercise 6    Exercise Name 6 HL clmashells  -DAYAN      Equipment 6 Theraband  -DAYAN      Resistance 6  Red  -DAYAN      Sets 6 2  -DAYAN      Reps 6 10  -DAYAN        User Key  (r) = Recorded By, (t) = Taken By, (c) = Cosigned By    Initials Name Provider Type    DAYAN Zhao PTA Physical Therapy Assistant                               PT OP Goals       03/20/17 1100       PT Short Term Goals    STG Date to Achieve 03/31/17  -DAYAN     STG 1 I with HEp and have additions/changes by next recertification.  -DAYAN     STG 1 Progress Progressing  -DAYAN     STG 2 AROM lumbar extension 35° or greater.  -DAYAN     STG 2 Progress Progressing  -DAYAN     STG 3 B hip flexion 5/5  -DAYAN     STG 3 Progress Progressing  -DAYAN     STG 4 B HS 5/5.  -DAYAN     STG 4 Progress Progressing  -DAYAN     STG 5 Negative prabha B.  -DAYAN     STG 5 Progress Progressing  -DAYAN     Long Term Goals    LTG Date to Achieve 04/07/17  -DAYAN     LTG 1 Able to show proper lifting technique floor to waist to shoulder level.  -DAYAN     LTG 1 Progress Progressing  -DAYAN     LTG 2 Able to show proper ergonomics for sweeping and dishes simulation.  -DAYAN     LTG 2 Progress Progressing  -DAYAN     LTG 3 I with final HEP.  -DAYAN     LTG 3 Progress Progressing  -DAYAN     LTG 4 D/C with free 30 day fitness formula membership.  -DAYAN     LTG 4 Progress Not Met  -DAYAN     Time Calculation    PT Goal Re-Cert Due Date 03/31/17  -DAYAN       User Key  (r) = Recorded By, (t) = Taken By, (c) = Cosigned By    Initials Name Provider Type    DAYAN Zhao PTA Physical Therapy Assistant                Therapy Education       03/20/17 1100          Therapy Education    Given HEP;Posture/body mechanics  -DAYAN      Program New  -DAYAN      How Provided Verbal;Demonstration;Written;Other (comment)  -DAYAN      Provided to Patient;Caregiver  -DAYAN      Level of Understanding Verbalized;Demonstrated;Other (comment)  -DAYAN        User Key  (r) = Recorded By, (t) = Taken By, (c) = Cosigned By    Initials Name Provider Type    DAYAN Zhao PTA Physical Therapy Assistant                Time Calculation:   Start Time:  1100  Stop Time: 1200  Time Calculation (min): 60 min  Total Timed Code Minutes- PT: 45 minute(s)    Therapy Charges for Today     Code Description Service Date Service Provider Modifiers Qty    51772428362  PT THER PROC EA 15 MIN 3/20/2017 Dominique Zhao, PTA GP 3    [id number]  PT THER SUPP EA 15 MIN 3/20/2017 Dominique Zhao PTA GP 1                    Dominique Zhao PTA  3/20/2017

## 2017-03-22 ENCOUNTER — APPOINTMENT (OUTPATIENT)
Dept: PHYSICAL THERAPY | Facility: HOSPITAL | Age: 72
End: 2017-03-22

## 2017-03-23 ENCOUNTER — HOSPITAL ENCOUNTER (OUTPATIENT)
Dept: PHYSICAL THERAPY | Facility: HOSPITAL | Age: 72
Setting detail: THERAPIES SERIES
Discharge: HOME | End: 2017-03-23

## 2017-03-23 DIAGNOSIS — M51.36 DEGENERATIVE DISC DISEASE, LUMBAR: Primary | ICD-10-CM

## 2017-03-23 PROCEDURE — 97110 THERAPEUTIC EXERCISES: CPT | Performed by: PHYSICAL THERAPY ASSISTANT

## 2017-03-23 NOTE — PROGRESS NOTES
Outpatient Physical Therapy Ortho Treatment Note  [facility]     Patient Name: Brionna Lorenzana  : 1945  MRN: [id number]  Today's Date: 3/23/2017      Visit Date: 2017    Visits    Recert Date 3/31/17   MD appointment May   Pt reports  ---% improvement       Visit Dx:    ICD-10-CM ICD-9-CM   1. Degenerative disc disease, lumbar M51.36 722.52       Patient Active Problem List   Diagnosis   • General medical examination   • Encounter for screening for osteoporosis   • Mixed hyperlipidemia   • Encounter for immunization   • Hypernatremia   • Microalbuminuria   • ACE-inhibitor cough   • Constipation   • Essential hypertension   • Prediabetes   • Osteoporosis   • Epigastric pain   • Gastroesophageal reflux disease   • Angioedema   • Low back pain with bilateral sciatica   • Chronic pain of both knees   • Heartburn   • Degenerative disc disease, lumbar   • Hyperlipidemia        Past Medical History:   Diagnosis Date   • Constipation    • Dyslipidemia    • Dysphagia     unspecified   • Encounter for screening for malignant neoplasm of colon    • Essential hypertension    • Hypercholesteremia    • Vitamin D deficiency     unspecified        Past Surgical History:   Procedure Laterality Date   • COLONOSCOPY  2016   • LARYNGOSCOPY  2016    Microdirect laryngoscopy with excision of right vocal cord lesion.   • TUBAL ABDOMINAL LIGATION               PT Ortho       17 1100    Subjective Comments    Subjective Comments pt states that the pain is not bad today  -DAYAN    Precautions and Contraindications    Precautions Does not speak english, use video interpretor on [employer]  -DAYAN    Subjective Pain    Able to rate subjective pain? yes  -DAYAN    Pre-Treatment Pain Level 3  -DAYAN    Post-Treatment Pain Level 0  -DAYAN      User Key  (r) = Recorded By, (t) = Taken By, (c) = Cosigned By    Initials Name Provider Type    DAYAN Zhao, PTA Physical Therapy Assistant                             PT Assessment/Plan       03/23/17 0900       PT Assessment    Assessment Comments Pt needed video  to complete tx, pt had decrease in pain this visit. Pt had slight LLD that was corrected.  -     PT Plan    PT Frequency 2x/week  -     Predicted Duration of Therapy Intervention (days/wks) 3-4 weeks 6-8 visits  -     PT Plan Comments sit to stands  -       User Key  (r) = Recorded By, (t) = Taken By, (c) = Cosigned By    Initials Name Provider Type     Kai Pacheco PTA Physical Therapy Assistant                    Exercises       03/23/17 0900          Subjective Comments    Subjective Comments Pt reports she was hurting in the shower the other night and it is not to bad today.   -      Subjective Pain    Able to rate subjective pain? yes  -      Pre-Treatment Pain Level 4  -JH      Post-Treatment Pain Level 0  -JH      Exercise 1    Exercise Name 1 Pro II LE's Seat 5  -JH      Resistance 1 --   L 4  -JH      Time (Minutes) 1 10  -JH      Exercise 2    Exercise Name 2 HS stretch  -JH      Reps 2 2  -JH      Time (Seconds) 2 30 sec hold  -JH      Exercise 3    Exercise Name 3 Bridge with ADD  -JH      Sets 3 2  -JH      Reps 3 10  -JH      Time (Seconds) 3 5 sec hold  -JH      Exercise 4    Exercise Name 4 LTR with ADD  -JH      Reps 4 10  -JH      Time (Seconds) 4 10 sec hold  -JH      Exercise 5    Exercise Name 5 DKTC with P ba;ll  -JH      Sets 5 2  -JH      Reps 5 10  -JH      Exercise 6    Exercise Name 6 seated piriformis stretch  -JH      Sets 6 2  -JH      Time (Seconds) 6 30  -JH      Exercise 7    Exercise Name 7 supine hip add  -JH      Sets 7 2  -JH      Reps 7 10  -JH      Exercise 8    Exercise Name 8 STM to R piriformis  -        User Key  (r) = Recorded By, (t) = Taken By, (c) = Cosigned By    Initials Name Provider Type     Kai Pacheco PTA Physical Therapy Assistant                               PT OP Goals       03/23/17 1000       PT Short  Term Goals    STG Date to Achieve 03/31/17  -     STG 1 I with HEp and have additions/changes by next recertification.  -     STG 1 Progress Progressing  -     STG 2 AROM lumbar extension 35° or greater.  -     STG 2 Progress Progressing  -     STG 3 B hip flexion 5/5  -     STG 3 Progress Progressing  -     STG 4 B HS 5/5.  -     STG 4 Progress Progressing  -     STG 5 Negative prabha B.  -     STG 5 Progress Progressing  -     Long Term Goals    LTG Date to Achieve 04/07/17  -     LTG 1 Able to show proper lifting technique floor to waist to shoulder level.  -     LTG 1 Progress Progressing  -     LTG 2 Able to show proper ergonomics for sweeping and dishes simulation.  -     LTG 2 Progress Progressing  -     LTG 3 I with final HEP.  -     LTG 3 Progress Progressing  -     LTG 4 D/C with free 30 day fitness formula membership.  -     LTG 4 Progress Not Met  -     Time Calculation    PT Goal Re-Cert Due Date 03/31/17  -       User Key  (r) = Recorded By, (t) = Taken By, (c) = Cosigned By    Initials Name Provider Type     Kai Pacheco PTA Physical Therapy Assistant                    Time Calculation:   Start Time: 0930  Stop Time: 1020  Time Calculation (min): 50 min    Therapy Charges for Today     Code Description Service Date Service Provider Modifiers Qty    [id number] HC PT THER PROC EA 15 MIN 3/23/2017 Kai Pacheco PTA GP 3                    Kai Pacheco PTA  3/23/2017

## 2017-03-27 ENCOUNTER — HOSPITAL ENCOUNTER (OUTPATIENT)
Dept: PHYSICAL THERAPY | Facility: HOSPITAL | Age: 72
Setting detail: THERAPIES SERIES
Discharge: HOME | End: 2017-03-27

## 2017-03-27 ENCOUNTER — APPOINTMENT (OUTPATIENT)
Dept: PHYSICAL THERAPY | Facility: HOSPITAL | Age: 72
End: 2017-03-27

## 2017-03-27 DIAGNOSIS — M51.36 DEGENERATIVE DISC DISEASE, LUMBAR: Primary | ICD-10-CM

## 2017-03-27 PROCEDURE — 97110 THERAPEUTIC EXERCISES: CPT | Performed by: SPECIALIST/TECHNOLOGIST

## 2017-03-29 ENCOUNTER — APPOINTMENT (OUTPATIENT)
Dept: PHYSICAL THERAPY | Facility: HOSPITAL | Age: 72
End: 2017-03-29

## 2017-04-03 ENCOUNTER — APPOINTMENT (OUTPATIENT)
Dept: PHYSICAL THERAPY | Facility: HOSPITAL | Age: 72
End: 2017-04-03

## 2017-04-05 ENCOUNTER — APPOINTMENT (OUTPATIENT)
Dept: PHYSICAL THERAPY | Facility: HOSPITAL | Age: 72
End: 2017-04-05

## 2017-04-06 ENCOUNTER — APPOINTMENT (OUTPATIENT)
Dept: PHYSICAL THERAPY | Facility: HOSPITAL | Age: 72
End: 2017-04-06

## 2017-05-05 ENCOUNTER — OFFICE VISIT (OUTPATIENT)
Dept: FAMILY MEDICINE CLINIC | Facility: CLINIC | Age: 72
End: 2017-05-05

## 2017-05-05 VITALS
RESPIRATION RATE: 16 BRPM | SYSTOLIC BLOOD PRESSURE: 140 MMHG | HEART RATE: 76 BPM | HEIGHT: 60 IN | WEIGHT: 122.4 LBS | DIASTOLIC BLOOD PRESSURE: 80 MMHG | TEMPERATURE: 98.6 F | BODY MASS INDEX: 24.03 KG/M2

## 2017-05-05 DIAGNOSIS — M81.0 OSTEOPOROSIS: Primary | ICD-10-CM

## 2017-05-05 DIAGNOSIS — Z23 NEED FOR VACCINATION: ICD-10-CM

## 2017-05-05 DIAGNOSIS — E78.5 HYPERLIPIDEMIA, UNSPECIFIED HYPERLIPIDEMIA TYPE: ICD-10-CM

## 2017-05-05 DIAGNOSIS — R12 HEARTBURN: ICD-10-CM

## 2017-05-05 DIAGNOSIS — Z12.31 ENCOUNTER FOR SCREENING MAMMOGRAM FOR MALIGNANT NEOPLASM OF BREAST: Primary | ICD-10-CM

## 2017-05-05 DIAGNOSIS — I10 ESSENTIAL HYPERTENSION: ICD-10-CM

## 2017-05-05 DIAGNOSIS — E55.9 VITAMIN D DEFICIENCY: ICD-10-CM

## 2017-05-05 PROCEDURE — 99213 OFFICE O/P EST LOW 20 MIN: CPT | Performed by: FAMILY MEDICINE

## 2017-05-05 PROCEDURE — 90471 IMMUNIZATION ADMIN: CPT | Performed by: FAMILY MEDICINE

## 2017-05-05 PROCEDURE — 90715 TDAP VACCINE 7 YRS/> IM: CPT | Performed by: FAMILY MEDICINE

## 2017-05-05 RX ORDER — CHOLECALCIFEROL (VITAMIN D3) 1250 MCG
50000 CAPSULE ORAL
Qty: 12 CAPSULE | Refills: 3 | Status: SHIPPED | OUTPATIENT
Start: 2017-05-05

## 2017-05-05 RX ORDER — OMEPRAZOLE 40 MG/1
40 CAPSULE, DELAYED RELEASE ORAL DAILY
Qty: 90 CAPSULE | Refills: 3 | Status: SHIPPED | OUTPATIENT
Start: 2017-05-05

## 2017-05-05 RX ORDER — ASPIRIN 81 MG/1
81 TABLET, CHEWABLE ORAL DAILY
Qty: 90 TABLET | Refills: 3 | Status: SHIPPED | OUTPATIENT
Start: 2017-05-05

## 2017-05-05 RX ORDER — ALENDRONATE SODIUM 70 MG/1
70 TABLET ORAL
Qty: 12 TABLET | Refills: 3 | Status: SHIPPED | OUTPATIENT
Start: 2017-05-05

## 2017-05-05 RX ORDER — DOCUSATE SODIUM 100 MG/1
100 CAPSULE, LIQUID FILLED ORAL 2 TIMES DAILY
Qty: 180 CAPSULE | Refills: 3 | Status: SHIPPED | OUTPATIENT
Start: 2017-05-05

## 2017-05-05 RX ORDER — SIMVASTATIN 80 MG
80 TABLET ORAL NIGHTLY
Qty: 90 TABLET | Refills: 3 | Status: SHIPPED | OUTPATIENT
Start: 2017-05-05

## 2017-05-05 RX ORDER — FEXOFENADINE HYDROCHLORIDE 60 MG/1
60 TABLET, FILM COATED ORAL 2 TIMES DAILY
Qty: 180 TABLET | Refills: 3 | Status: SHIPPED | OUTPATIENT
Start: 2017-05-05

## 2017-05-05 RX ORDER — HYDROCHLOROTHIAZIDE 25 MG/1
25 TABLET ORAL DAILY
Qty: 90 TABLET | Refills: 3 | Status: SHIPPED | OUTPATIENT
Start: 2017-05-05

## 2017-05-23 ENCOUNTER — DOCUMENTATION (OUTPATIENT)
Dept: PHYSICAL THERAPY | Facility: HOSPITAL | Age: 72
End: 2017-05-23

## 2019-11-05 NOTE — ADDENDUM NOTE
Encounter addended by: Rocío Gann PT DPT on: 11/5/2019 7:24 AM   Actions taken: Cosign clinical note with attestation